# Patient Record
Sex: MALE | Race: WHITE
[De-identification: names, ages, dates, MRNs, and addresses within clinical notes are randomized per-mention and may not be internally consistent; named-entity substitution may affect disease eponyms.]

---

## 2019-09-25 ENCOUNTER — HOSPITAL ENCOUNTER (EMERGENCY)
Dept: HOSPITAL 84 - D.ER | Age: 54
Discharge: HOME | End: 2019-09-25
Payer: MEDICAID

## 2019-09-25 VITALS
WEIGHT: 175.37 LBS | BODY MASS INDEX: 24.55 KG/M2 | WEIGHT: 175.37 LBS | BODY MASS INDEX: 24.55 KG/M2 | HEIGHT: 71 IN | HEIGHT: 71 IN

## 2019-09-25 VITALS — DIASTOLIC BLOOD PRESSURE: 99 MMHG | SYSTOLIC BLOOD PRESSURE: 169 MMHG

## 2019-09-25 DIAGNOSIS — S20.212A: Primary | ICD-10-CM
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
## 2019-09-25 DIAGNOSIS — F17.210: ICD-10-CM

## 2019-09-25 DIAGNOSIS — R07.89: ICD-10-CM

## 2019-09-25 NOTE — NUR
DR MARIANO NOTIFIED AND REVIEWED PT'S BEHAVIOR AND ASSESSMENT RESULTS.  PT IS
A LOW RISK PER DR MARIANO.  DR MARIANO STATED TO GIVE RESOURCES TO PT AT TIME
OF DISCHARGE.  NO FURTHER ORDERS AT THIS TIME.  RESOURCES REVIEWED WITH PT AND
HE VERBALIZED UNDERSTANDING.  PT VEHEMENTLY DENIES ANY THOUGHTS OF SUICIDE.

## 2019-09-26 ENCOUNTER — HOSPITAL ENCOUNTER (EMERGENCY)
Dept: HOSPITAL 84 - D.ER | Age: 54
Discharge: OTHER INSTITUTION | End: 2019-09-26
Payer: MEDICAID

## 2019-09-26 VITALS
HEIGHT: 71 IN | WEIGHT: 175.37 LBS | BODY MASS INDEX: 24.55 KG/M2 | HEIGHT: 71 IN | WEIGHT: 175.37 LBS | BODY MASS INDEX: 24.55 KG/M2

## 2019-09-26 VITALS — SYSTOLIC BLOOD PRESSURE: 180 MMHG | DIASTOLIC BLOOD PRESSURE: 104 MMHG

## 2019-09-26 DIAGNOSIS — R07.89: Primary | ICD-10-CM
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
## 2019-09-26 LAB
ALBUMIN SERPL-MCNC: 4.2 G/DL (ref 3.4–5)
ALP SERPL-CCNC: 126 U/L (ref 46–116)
ALT SERPL-CCNC: 80 U/L (ref 10–68)
AMORPHOUS SEDIMENT: (no result) /LPF
AMPHETAMINES UR QL SCN: NEGATIVE QUAL
ANION GAP SERPL CALC-SCNC: 16.6 MMOL/L (ref 8–16)
APPEARANCE UR: (no result)
APTT BLD: 37.3 SECONDS (ref 22.8–39.4)
BACTERIA #/AREA URNS HPF: (no result) /HPF
BARBITURATES UR QL SCN: NEGATIVE QUAL
BASOPHILS NFR BLD AUTO: 0 % (ref 0–2)
BENZODIAZ UR QL SCN: NEGATIVE QUAL
BILIRUB SERPL-MCNC: (no result) MG/DL
BILIRUB SERPL-MCNC: 1.65 MG/DL (ref 0.2–1.3)
BUN SERPL-MCNC: 29 MG/DL (ref 7–18)
BZE UR QL SCN: NEGATIVE QUAL
CALCIUM SERPL-MCNC: 9.2 MG/DL (ref 8.5–10.1)
CANNABINOIDS UR QL SCN: POSITIVE QUAL
CHLORIDE SERPL-SCNC: 105 MMOL/L (ref 98–107)
CK MB SERPL-MCNC: 15.6 U/L (ref 0–3.6)
CK SERPL-CCNC: 1560 UL (ref 21–232)
CO2 SERPL-SCNC: 26.3 MMOL/L (ref 21–32)
COLOR UR: (no result)
CREAT SERPL-MCNC: 1.5 MG/DL (ref 0.6–1.3)
EOSINOPHIL NFR BLD: 0 % (ref 0–7)
ERYTHROCYTE [DISTWIDTH] IN BLOOD BY AUTOMATED COUNT: 15.3 % (ref 11.5–14.5)
GLOBULIN SER-MCNC: 3.7 G/L
GLUCOSE SERPL-MCNC: 137 MG/DL (ref 74–106)
GLUCOSE SERPL-MCNC: NEGATIVE MG/DL
GRAN CASTS #/AREA URNS LPF: (no result) /LPF
HCT VFR BLD CALC: 42.4 % (ref 42–54)
HGB BLD-MCNC: 14.2 G/DL (ref 13.5–17.5)
IMM GRANULOCYTES NFR BLD: 0.3 % (ref 0–5)
INR PPP: 1.19 (ref 0.85–1.17)
KETONES UR STRIP-MCNC: (no result) MG/DL
LYMPHOCYTES NFR BLD AUTO: 3.7 % (ref 15–50)
MAGNESIUM SERPL-MCNC: 2.3 MG/DL (ref 1.8–2.4)
MCH RBC QN AUTO: 34.5 PG (ref 26–34)
MCHC RBC AUTO-ENTMCNC: 33.5 G/DL (ref 31–37)
MCV RBC: 102.9 FL (ref 80–100)
MONOCYTES NFR BLD: 7.1 % (ref 2–11)
NEUTROPHILS NFR BLD AUTO: 88.9 % (ref 40–80)
NITRITE UR-MCNC: NEGATIVE MG/ML
OPIATES UR QL SCN: NEGATIVE QUAL
OSMOLALITY SERPL CALC.SUM OF ELEC: 294 MOSM/KG (ref 275–300)
PCP UR QL SCN: NEGATIVE QUAL
PH UR STRIP: 5 [PH] (ref 5–6)
PLATELET # BLD: 196 10X3/UL (ref 130–400)
PMV BLD AUTO: 10.1 FL (ref 7.4–10.4)
POTASSIUM SERPL-SCNC: 3.9 MMOL/L (ref 3.5–5.1)
PROT SERPL-MCNC: 7.9 G/DL (ref 6.4–8.2)
PROT UR-MCNC: (no result) MG/DL
PROTHROMBIN TIME: 14.6 SECONDS (ref 11.6–15)
RBC # BLD AUTO: 4.12 10X6/UL (ref 4.2–6.1)
RBC #/AREA URNS HPF: (no result) /HPF (ref 0–5)
SODIUM SERPL-SCNC: 144 MMOL/L (ref 136–145)
SP GR UR STRIP: 1.02 (ref 1–1.02)
SQUAMOUS #/AREA URNS HPF: (no result) /HPF (ref 0–5)
TROPONIN I SERPL-MCNC: 1.09 NG/ML (ref 0–0.06)
TSH SERPL-ACNC: 0.93 UIU/ML (ref 0.36–3.74)
UROBILINOGEN UR-MCNC: NORMAL MG/DL
WBC # BLD AUTO: 11.9 10X3/UL (ref 4.8–10.8)
WBC #/AREA URNS HPF: (no result) /HPF